# Patient Record
Sex: MALE | Race: WHITE | NOT HISPANIC OR LATINO | Employment: OTHER | URBAN - METROPOLITAN AREA
[De-identification: names, ages, dates, MRNs, and addresses within clinical notes are randomized per-mention and may not be internally consistent; named-entity substitution may affect disease eponyms.]

---

## 2016-11-28 LAB
CREAT ?TM UR-SCNC: 129 UMOL/L
MICROALBUMIN UR-MCNC: 1.3 MG/L (ref 0–20)
MICROALBUMIN/CREAT UR: 10 MG/G{CREAT}

## 2018-08-01 ENCOUNTER — TRANSCRIBE ORDERS (OUTPATIENT)
Dept: ADMINISTRATIVE | Facility: HOSPITAL | Age: 78
End: 2018-08-01

## 2018-08-01 ENCOUNTER — APPOINTMENT (OUTPATIENT)
Dept: LAB | Facility: CLINIC | Age: 78
End: 2018-08-01
Payer: MEDICARE

## 2018-08-01 DIAGNOSIS — R79.89 HYPOURICEMIA: ICD-10-CM

## 2018-08-01 DIAGNOSIS — R79.89 BLOOD CREATININE INCREASED COMPARED WITH PRIOR MEASUREMENT: Primary | ICD-10-CM

## 2018-08-01 LAB
ALBUMIN SERPL BCP-MCNC: 4.2 G/DL (ref 3.5–5)
ALP SERPL-CCNC: 35 U/L (ref 46–116)
ALT SERPL W P-5'-P-CCNC: 27 U/L (ref 12–78)
ANION GAP SERPL CALCULATED.3IONS-SCNC: 7 MMOL/L (ref 4–13)
AST SERPL W P-5'-P-CCNC: 19 U/L (ref 5–45)
BACTERIA UR QL AUTO: NORMAL /HPF
BASOPHILS # BLD AUTO: 0.03 THOUSANDS/ΜL (ref 0–0.1)
BASOPHILS NFR BLD AUTO: 0 % (ref 0–1)
BILIRUB SERPL-MCNC: 0.3 MG/DL (ref 0.2–1)
BILIRUB UR QL STRIP: NEGATIVE
BUN SERPL-MCNC: 30 MG/DL (ref 5–25)
CALCIUM SERPL-MCNC: 9.9 MG/DL (ref 8.3–10.1)
CHLORIDE SERPL-SCNC: 103 MMOL/L (ref 100–108)
CLARITY UR: CLEAR
CO2 SERPL-SCNC: 29 MMOL/L (ref 21–32)
COLOR UR: YELLOW
CREAT SERPL-MCNC: 2.01 MG/DL (ref 0.6–1.3)
CREAT UR-MCNC: 137 MG/DL
EOSINOPHIL # BLD AUTO: 0.24 THOUSAND/ΜL (ref 0–0.61)
EOSINOPHIL NFR BLD AUTO: 3 % (ref 0–6)
ERYTHROCYTE [DISTWIDTH] IN BLOOD BY AUTOMATED COUNT: 12.4 % (ref 11.6–15.1)
ERYTHROCYTE [SEDIMENTATION RATE] IN BLOOD: 10 MM/HOUR (ref 0–10)
GFR SERPL CREATININE-BSD FRML MDRD: 31 ML/MIN/1.73SQ M
GLUCOSE SERPL-MCNC: 115 MG/DL (ref 65–140)
GLUCOSE UR STRIP-MCNC: NEGATIVE MG/DL
HCT VFR BLD AUTO: 38.7 % (ref 36.5–49.3)
HGB BLD-MCNC: 13.1 G/DL (ref 12–17)
HGB UR QL STRIP.AUTO: NEGATIVE
IMM GRANULOCYTES # BLD AUTO: 0.05 THOUSAND/UL (ref 0–0.2)
IMM GRANULOCYTES NFR BLD AUTO: 1 % (ref 0–2)
KETONES UR STRIP-MCNC: NEGATIVE MG/DL
LEUKOCYTE ESTERASE UR QL STRIP: NEGATIVE
LYMPHOCYTES # BLD AUTO: 1.45 THOUSANDS/ΜL (ref 0.6–4.47)
LYMPHOCYTES NFR BLD AUTO: 17 % (ref 14–44)
MCH RBC QN AUTO: 31.5 PG (ref 26.8–34.3)
MCHC RBC AUTO-ENTMCNC: 33.9 G/DL (ref 31.4–37.4)
MCV RBC AUTO: 93 FL (ref 82–98)
MICROALBUMIN UR-MCNC: 16.2 MG/L (ref 0–20)
MICROALBUMIN/CREAT 24H UR: 12 MG/G CREATININE (ref 0–30)
MONOCYTES # BLD AUTO: 0.7 THOUSAND/ΜL (ref 0.17–1.22)
MONOCYTES NFR BLD AUTO: 8 % (ref 4–12)
NEUTROPHILS # BLD AUTO: 5.9 THOUSANDS/ΜL (ref 1.85–7.62)
NEUTS SEG NFR BLD AUTO: 71 % (ref 43–75)
NITRITE UR QL STRIP: NEGATIVE
NON-SQ EPI CELLS URNS QL MICRO: NORMAL /HPF
NRBC BLD AUTO-RTO: 0 /100 WBCS
PH UR STRIP.AUTO: 5.5 [PH] (ref 4.5–8)
PLATELET # BLD AUTO: 265 THOUSANDS/UL (ref 149–390)
PMV BLD AUTO: 9.1 FL (ref 8.9–12.7)
POTASSIUM SERPL-SCNC: 4 MMOL/L (ref 3.5–5.3)
PROT SERPL-MCNC: 7.6 G/DL (ref 6.4–8.2)
PROT UR STRIP-MCNC: NEGATIVE MG/DL
PSA SERPL-MCNC: 0.5 NG/ML (ref 0–4)
RBC # BLD AUTO: 4.16 MILLION/UL (ref 3.88–5.62)
RBC #/AREA URNS AUTO: NORMAL /HPF
SODIUM SERPL-SCNC: 139 MMOL/L (ref 136–145)
SP GR UR STRIP.AUTO: 1.02 (ref 1–1.03)
UROBILINOGEN UR QL STRIP.AUTO: 0.2 E.U./DL
WBC # BLD AUTO: 8.37 THOUSAND/UL (ref 4.31–10.16)
WBC #/AREA URNS AUTO: NORMAL /HPF

## 2018-08-01 PROCEDURE — 81001 URINALYSIS AUTO W/SCOPE: CPT | Performed by: FAMILY MEDICINE

## 2018-08-01 PROCEDURE — 86038 ANTINUCLEAR ANTIBODIES: CPT

## 2018-08-01 PROCEDURE — 36415 COLL VENOUS BLD VENIPUNCTURE: CPT

## 2018-08-01 PROCEDURE — 82043 UR ALBUMIN QUANTITATIVE: CPT | Performed by: FAMILY MEDICINE

## 2018-08-01 PROCEDURE — 80053 COMPREHEN METABOLIC PANEL: CPT

## 2018-08-01 PROCEDURE — 84153 ASSAY OF PSA TOTAL: CPT

## 2018-08-01 PROCEDURE — 82570 ASSAY OF URINE CREATININE: CPT | Performed by: FAMILY MEDICINE

## 2018-08-01 PROCEDURE — 85025 COMPLETE CBC W/AUTO DIFF WBC: CPT

## 2018-08-01 PROCEDURE — 85652 RBC SED RATE AUTOMATED: CPT

## 2018-08-02 ENCOUNTER — HOSPITAL ENCOUNTER (OUTPATIENT)
Dept: ULTRASOUND IMAGING | Facility: HOSPITAL | Age: 78
Discharge: HOME | End: 2018-08-02
Payer: MEDICARE

## 2018-08-02 ENCOUNTER — TELEPHONE (OUTPATIENT)
Dept: NEPHROLOGY | Facility: CLINIC | Age: 78
End: 2018-08-02

## 2018-08-02 ENCOUNTER — OFFICE VISIT (OUTPATIENT)
Dept: NEPHROLOGY | Facility: CLINIC | Age: 78
End: 2018-08-02
Payer: MEDICARE

## 2018-08-02 VITALS
HEIGHT: 70 IN | BODY MASS INDEX: 32.21 KG/M2 | SYSTOLIC BLOOD PRESSURE: 122 MMHG | DIASTOLIC BLOOD PRESSURE: 78 MMHG | HEART RATE: 76 BPM | WEIGHT: 225 LBS

## 2018-08-02 DIAGNOSIS — R79.89 BLOOD CREATININE INCREASED COMPARED WITH PRIOR MEASUREMENT: ICD-10-CM

## 2018-08-02 DIAGNOSIS — I12.9 BENIGN HYPERTENSION WITH CKD (CHRONIC KIDNEY DISEASE) STAGE III (HCC): ICD-10-CM

## 2018-08-02 DIAGNOSIS — R80.1 PERSISTENT PROTEINURIA: ICD-10-CM

## 2018-08-02 DIAGNOSIS — N18.30 BENIGN HYPERTENSION WITH CKD (CHRONIC KIDNEY DISEASE) STAGE III (HCC): ICD-10-CM

## 2018-08-02 DIAGNOSIS — N17.9 AKI (ACUTE KIDNEY INJURY) (HCC): Primary | ICD-10-CM

## 2018-08-02 DIAGNOSIS — R79.89 ELEVATED SERUM CREATININE: ICD-10-CM

## 2018-08-02 DIAGNOSIS — N18.30 STAGE 3 CHRONIC KIDNEY DISEASE (HCC): ICD-10-CM

## 2018-08-02 LAB
SL AMB  POCT GLUCOSE, UA: NORMAL
SL AMB LEUKOCYTE ESTERASE,UA: NORMAL
SL AMB POCT BILIRUBIN,UA: NORMAL
SL AMB POCT BLOOD,UA: NORMAL
SL AMB POCT CLARITY,UA: CLEAR
SL AMB POCT COLOR,UA: YELLOW
SL AMB POCT KETONES,UA: NORMAL
SL AMB POCT NITRITE,UA: NORMAL
SL AMB POCT PH,UA: 6.5
SL AMB POCT SPECIFIC GRAVITY,UA: 1.01
SL AMB POCT URINE PROTEIN: NORMAL
SL AMB POCT UROBILINOGEN: 0.2

## 2018-08-02 PROCEDURE — 99204 OFFICE O/P NEW MOD 45 MIN: CPT | Performed by: INTERNAL MEDICINE

## 2018-08-02 PROCEDURE — 76770 US EXAM ABDO BACK WALL COMP: CPT

## 2018-08-02 PROCEDURE — 81002 URINALYSIS NONAUTO W/O SCOPE: CPT | Performed by: INTERNAL MEDICINE

## 2018-08-02 RX ORDER — SIMVASTATIN 40 MG
40 TABLET ORAL
COMMUNITY

## 2018-08-02 RX ORDER — TRIAMTERENE AND HYDROCHLOROTHIAZIDE 37.5; 25 MG/1; MG/1
1 CAPSULE ORAL EVERY MORNING
COMMUNITY
End: 2018-08-02

## 2018-08-02 RX ORDER — AMLODIPINE BESYLATE 5 MG/1
5 TABLET ORAL DAILY
Qty: 30 TABLET | Refills: 1 | Status: SHIPPED | OUTPATIENT
Start: 2018-08-02

## 2018-08-02 RX ORDER — OMEGA-3 FATTY ACIDS CAP DELAYED RELEASE 1000 MG 1000 MG
1 CAPSULE DELAYED RELEASE ORAL DAILY
COMMUNITY

## 2018-08-02 RX ORDER — OMEPRAZOLE 20 MG/1
20 CAPSULE, DELAYED RELEASE ORAL DAILY
COMMUNITY

## 2018-08-02 RX ORDER — GABAPENTIN 300 MG/1
100 CAPSULE ORAL 4 TIMES DAILY
COMMUNITY

## 2018-08-02 RX ORDER — FENOFIBRATE 160 MG/1
160 TABLET ORAL DAILY
COMMUNITY

## 2018-08-02 RX ORDER — LANOLIN ALCOHOL/MO/W.PET/CERES
400 CREAM (GRAM) TOPICAL DAILY
COMMUNITY

## 2018-08-02 RX ORDER — MECLIZINE HYDROCHLORIDE 25 MG/1
TABLET ORAL 3 TIMES DAILY PRN
COMMUNITY

## 2018-08-02 RX ORDER — DIAZEPAM 5 MG/1
5 TABLET ORAL EVERY 6 HOURS PRN
COMMUNITY

## 2018-08-02 RX ORDER — LANOLIN ALCOHOL/MO/W.PET/CERES
3 CREAM (GRAM) TOPICAL
COMMUNITY

## 2018-08-02 RX ORDER — GLIMEPIRIDE 4 MG/1
4 TABLET ORAL
COMMUNITY

## 2018-08-02 RX ORDER — LOSARTAN POTASSIUM 50 MG/1
50 TABLET ORAL DAILY
COMMUNITY

## 2018-08-02 RX ORDER — VITAMIN B COMPLEX
1 CAPSULE ORAL DAILY
COMMUNITY

## 2018-08-02 NOTE — PROGRESS NOTES
NEPHROLOGY OUTPATIENT CONSULTATION   Jessica Hawk 65 y o  male MRN: [id number]  Date: 8/2/2018  Reason for consultation:   Chief Complaint   Patient presents with    Consult    Chronic Kidney Disease    Acute Renal Failure       ASSESSMENT AND PLAN:  ?  DEDE versus progressive worsening CKD stage 3, previous baseline creatinine 1 4 to 1 7, follows with nephrologist in [place]  -creatinine 1 4 in December 2017 has worsened to two in June 2018  Since then creatinine seems to be stable at 2 0 in July and on 8/1/18   -patient had has been losing weight and has lost about 9 lb  Overall he has progression of his CKD although any component of DEDE since June 2018 cannot be excluded  -clinically he seems euvolemic although any component of prerenal could be contributing to elevated creatinine   -will hold triamterene/HCTZ for now  Reduce losartan from 50 mg to 25 mg p o  Daily  -repeat BMP in 10 days  If serum creatinine remains stable or improved, okay from renal standpoint for surgery   -also strongly advised to hold losartan the day before and on the day of the surgery  -urinalysis this month shows no hematuria or proteinuria  Urine microalbumin/creatinine ratio 12 mg  -patient is getting another renal ultrasound today and results to follow   -prior renal ultrasound in 2016 showed normal size both kidneys, mild increased echogenicity in both kidneys suggestive of CKD  No hydronephrosis  1 cm stone in left kidney  CKD thought to be secondary to long-term hypertension and diabetes    Hypertension  -blood pressure acceptable in the office today   -hold triamterene/HCTZ as above  Reduce losartan as above  Will start amlodipine 5 mg p o  daily for time being   -continue to monitor blood pressure closely   -salt restricted diet    Patient is being scheduled for meniscus surgery on right knee      HISTORY OF PRESENT ILLNESS:  Jessica Hawk is a 65y o  year old male with medical issues of hypertension for 20 years, diabetes for 15 years, CKD stage 3 with baseline creatinine 1 4 to 1 7, hyperlipidemia, who presents for initial consultation for renal failure and preop clearance  Old medical records were reviewed  Patient regularly follows with nephrologist in [place]  He is visiting in [place] and has required orthopedic surgery for meniscus on right knee requiring preop clearance  Patient's baseline serum creatinine seems to be 1 4 to 1 7 going back to 2016 %period% creatinine was 1 4 in December 2017 with repeat creatinine six months afterwards was 2 0 in June, July and again on 8/1/18  Patient denies any obvious symptoms suggesting prerenal etiology  Denies any nausea, vomiting or diarrhea  He has been trying to lose weight and has lost about 9 lb weight since he came to [place]  Denies any urinary symptoms including no dysuria or hematuria or hesitancy  No recent NSAID exposure  No other recent change in medications  He remains on stable dose of losartan and triamterene/HCTZ for hypertension  REVIEW OF SYSTEMS:    Review of Systems   Constitutional: Negative for chills, fatigue and fever  HENT: Negative for congestion, ear pain and postnasal drip  Eyes: Negative for redness and visual disturbance  Respiratory: Negative for cough and shortness of breath  Cardiovascular: Negative for chest pain and leg swelling  Gastrointestinal: Negative for abdominal pain, diarrhea, nausea and vomiting  Endocrine: Negative for polyuria  Genitourinary: Negative for decreased urine volume, difficulty urinating, dysuria, frequency, hematuria and urgency  Musculoskeletal: Negative for arthralgias and back pain  Skin: Negative for rash  Neurological: Negative for dizziness, light-headedness and headaches  Hematological: Does not bruise/bleed easily  Psychiatric/Behavioral: Negative for confusion  The patient is not nervous/anxious          More than 10 point review of systems were obtained and discussed in length with the patient  Complete review of systems were negative / unremarkable except mentioned in the HPI section  PHYSICAL EXAM:  Vitals:    08/02/18 1204 08/02/18 1242   BP: 152/86 122/78   BP Location: Left arm    Patient Position: Sitting    Cuff Size: Adult    Pulse: 76    Weight: 102 kg (225 lb)    Height: 5' 10" (1 778 m)      Body mass index is 32 28 kg/m²  Physical Exam   Constitutional: He is oriented to person, place, and time  He appears well-developed and well-nourished  HENT:   Head: Normocephalic and atraumatic  Right Ear: External ear normal    Left Ear: External ear normal    Nose: Nose normal    Eyes: Conjunctivae and EOM are normal  Pupils are equal, round, and reactive to light  No scleral icterus  Neck: Neck supple  No JVD present  Cardiovascular: Normal rate and normal heart sounds  Pulmonary/Chest: Effort normal and breath sounds normal  No respiratory distress  He has no wheezes  He has no rales  Abdominal: Soft  Bowel sounds are normal  He exhibits no distension  There is no tenderness  Musculoskeletal: He exhibits no edema or tenderness  Neurological: He is alert and oriented to person, place, and time  Skin: Skin is warm and dry  Psychiatric: He has a normal mood and affect  His behavior is normal    Vitals reviewed        PAST MEDICAL HISTORY:  Past Medical History:   Diagnosis Date    Chronic kidney disease     Diabetes mellitus ([facility] [address] )        PAST SURGICAL HISTORY:  Past Surgical History:   Procedure Laterality Date    BACK SURGERY      GALLBLADDER SURGERY      LAMINECTOMY      REPLACEMENT TOTAL KNEE      URETHRECTOMY         ALLERGIES:  No Known Allergies    SOCIAL HISTORY:  History   Alcohol Use No     History   Drug Use No     History   Smoking Status    Former Smoker   Smokeless Tobacco    Never Used       FAMILY HISTORY:  Family History   Problem Relation Age of Onset    Cancer Father     Heart disease Father     Diabetes Brother     Cancer Brother        MEDICATIONS:    Current Outpatient Prescriptions:     b complex vitamins capsule, Take 1 capsule by mouth daily, Disp: , Rfl:     diazepam (VALIUM) 5 mg tablet, Take 5 mg by mouth every 6 (six) hours as needed for anxiety, Disp: , Rfl:     fenofibrate (TRIGLIDE) 160 MG tablet, Take 160 mg by mouth daily, Disp: , Rfl:     fluticasone (VERAMYST) 27 5 MCG/SPRAY nasal spray, 2 sprays into each nostril daily, Disp: , Rfl:     folic acid (FOLVITE) 854 mcg tablet, Take 400 mcg by mouth daily, Disp: , Rfl:     gabapentin (NEURONTIN) 300 mg capsule, Take 100 mg by mouth 4 (four) times a day, Disp: , Rfl:     glimepiride (AMARYL) 4 mg tablet, Take 4 mg by mouth every morning before breakfast, Disp: , Rfl:     losartan (COZAAR) 50 mg tablet, Take 50 mg by mouth daily , Disp: , Rfl:     meclizine (ANTIVERT) 25 mg tablet, Take by mouth 3 (three) times a day as needed for dizziness, Disp: , Rfl:     melatonin 3 mg, Take 3 mg by mouth daily at bedtime, Disp: , Rfl:     Omega-3 Fatty Acids (FISH OIL) 1000 MG CPDR, Take 1 capsule by mouth daily, Disp: , Rfl:     omeprazole (PriLOSEC) 20 mg delayed release capsule, Take 20 mg by mouth daily, Disp: , Rfl:     simvastatin (ZOCOR) 40 mg tablet, Take 40 mg by mouth daily at bedtime, Disp: , Rfl:     amLODIPine (NORVASC) 5 mg tablet, Take 1 tablet (5 mg total) by mouth daily, Disp: 30 tablet, Rfl: 1    Lab Results:   Results for orders placed or performed in visit on 08/02/18   POCT urine dip   Result Value Ref Range    LEUKOCYTE ESTERASE,UA NEG      NITRITE,UA NEG     SL AMB POCT UROBILINOGEN 0 2     SL AMB POCT URINE PROTEIN NEG      PH,UA 6 5      BLOOD,UA NEG      SPECIFIC GRAVITY,UA 1 015      KETONES,UA NEG      BILIRUBIN,UA NEG     GLUCOSE, UA NEG      COLOR,UA YELLOW      CLARITY,UA CLEAR      Serum creatinine 2 0 on 8/1/18  Portions of the record may have been created with voice recognition software   Occasional wrong word or "sound a like" substitutions may have occurred due to the inherent limitations of voice recognition software  Read the chart carefully and recognize, using context, where substitutions have occurred

## 2018-08-02 NOTE — LETTER
August 2, 2018     [address] [place] [address] Mary Leal    Patient: Mortimer Lather   YOB: 1940   Date of Visit: 8/2/2018       Dear Dr Jinny Masters:    Thank you for referring Gaurav Olivarez to me for evaluation  Below are my notes for this consultation  If you have questions, please do not hesitate to call me  I look forward to following your patient along with you  Sincerely,        Anais Grover MD        CC: MD Anais Shelley MD  8/2/2018  4:09 PM  Sign at close encounter  [address] [place] [address] 65 y o  male MRN: [id number]  Date: 8/2/2018  Reason for consultation:   Chief Complaint   Patient presents with    Consult    Chronic Kidney Disease    Acute Renal Failure       ASSESSMENT AND PLAN:  ?  DEDE versus progressive worsening CKD stage 3, previous baseline creatinine 1 4 to 1 7, follows with nephrologist in [place]  -creatinine 1 4 in December 2017 has worsened to two in June 2018  Since then creatinine seems to be stable at 2 0 in July and on 8/1/18   -patient had has been losing weight and has lost about 9 lb  Overall he has progression of his CKD although any component of DEDE since June 2018 cannot be excluded  -clinically he seems euvolemic although any component of prerenal could be contributing to elevated creatinine   -will hold triamterene/HCTZ for now  Reduce losartan from 50 mg to 25 mg p o  Daily  -repeat BMP in 10 days  If serum creatinine remains stable or improved, okay from renal standpoint for surgery   -also strongly advised to hold losartan the day before and on the day of the surgery  -urinalysis this month shows no hematuria or proteinuria  Urine microalbumin/creatinine ratio 12 mg  -patient is getting another renal ultrasound today and results to follow   -prior renal ultrasound in 2016 showed normal size both kidneys, mild increased echogenicity in both kidneys suggestive of CKD  No hydronephrosis    1 cm stone in left kidney  CKD thought to be secondary to long-term hypertension and diabetes    Hypertension  -blood pressure acceptable in the office today   -hold triamterene/HCTZ as above  Reduce losartan as above  Will start amlodipine 5 mg p o  daily for time being   -continue to monitor blood pressure closely   -salt restricted diet    Patient is being scheduled for meniscus surgery on right knee  HISTORY OF PRESENT ILLNESS:  Azucena Schirmer is a 65y o  year old male with medical issues of hypertension for 20 years, diabetes for 15 years, CKD stage 3 with baseline creatinine 1 4 to 1 7, hyperlipidemia, who presents for initial consultation for renal failure and preop clearance  Old medical records were reviewed  Patient regularly follows with nephrologist in [place]  He is visiting in [place] and has required orthopedic surgery for meniscus on right knee requiring preop clearance  Patient's baseline serum creatinine seems to be 1 4 to 1 7 going back to 2016 %period% creatinine was 1 4 in December 2017 with repeat creatinine six months afterwards was 2 0 in June, July and again on 8/1/18  Patient denies any obvious symptoms suggesting prerenal etiology  Denies any nausea, vomiting or diarrhea  He has been trying to lose weight and has lost about 9 lb weight since he came to [place]  Denies any urinary symptoms including no dysuria or hematuria or hesitancy  No recent NSAID exposure  No other recent change in medications  He remains on stable dose of losartan and triamterene/HCTZ for hypertension  REVIEW OF SYSTEMS:    Review of Systems   Constitutional: Negative for chills, fatigue and fever  HENT: Negative for congestion, ear pain and postnasal drip  Eyes: Negative for redness and visual disturbance  Respiratory: Negative for cough and shortness of breath  Cardiovascular: Negative for chest pain and leg swelling     Gastrointestinal: Negative for abdominal pain, diarrhea, nausea and vomiting  Endocrine: Negative for polyuria  Genitourinary: Negative for decreased urine volume, difficulty urinating, dysuria, frequency, hematuria and urgency  Musculoskeletal: Negative for arthralgias and back pain  Skin: Negative for rash  Neurological: Negative for dizziness, light-headedness and headaches  Hematological: Does not bruise/bleed easily  Psychiatric/Behavioral: Negative for confusion  The patient is not nervous/anxious  More than 10 point review of systems were obtained and discussed in length with the patient  Complete review of systems were negative / unremarkable except mentioned in the HPI section  PHYSICAL EXAM:  Vitals:    08/02/18 1204 08/02/18 1242   BP: 152/86 122/78   BP Location: Left arm    Patient Position: Sitting    Cuff Size: Adult    Pulse: 76    Weight: 102 kg (225 lb)    Height: 5' 10" (1 778 m)      Body mass index is 32 28 kg/m²  Physical Exam   Constitutional: He is oriented to person, place, and time  He appears well-developed and well-nourished  HENT:   Head: Normocephalic and atraumatic  Right Ear: External ear normal    Left Ear: External ear normal    Nose: Nose normal    Eyes: Conjunctivae and EOM are normal  Pupils are equal, round, and reactive to light  No scleral icterus  Neck: Neck supple  No JVD present  Cardiovascular: Normal rate and normal heart sounds  Pulmonary/Chest: Effort normal and breath sounds normal  No respiratory distress  He has no wheezes  He has no rales  Abdominal: Soft  Bowel sounds are normal  He exhibits no distension  There is no tenderness  Musculoskeletal: He exhibits no edema or tenderness  Neurological: He is alert and oriented to person, place, and time  Skin: Skin is warm and dry  Psychiatric: He has a normal mood and affect  His behavior is normal    Vitals reviewed        PAST MEDICAL HISTORY:  Past Medical History:   Diagnosis Date    Chronic kidney disease     Diabetes mellitus ([facility] [address] )        PAST SURGICAL HISTORY:  Past Surgical History:   Procedure Laterality Date    BACK SURGERY      GALLBLADDER SURGERY      LAMINECTOMY      REPLACEMENT TOTAL KNEE      URETHRECTOMY         ALLERGIES:  No Known Allergies    SOCIAL HISTORY:  History   Alcohol Use No     History   Drug Use No     History   Smoking Status    Former Smoker   Smokeless Tobacco    Never Used       FAMILY HISTORY:  Family History   Problem Relation Age of Onset    Cancer Father     Heart disease Father     Diabetes Brother     Cancer Brother        MEDICATIONS:    Current Outpatient Prescriptions:     b complex vitamins capsule, Take 1 capsule by mouth daily, Disp: , Rfl:     diazepam (VALIUM) 5 mg tablet, Take 5 mg by mouth every 6 (six) hours as needed for anxiety, Disp: , Rfl:     fenofibrate (TRIGLIDE) 160 MG tablet, Take 160 mg by mouth daily, Disp: , Rfl:     fluticasone (VERAMYST) 27 5 MCG/SPRAY nasal spray, 2 sprays into each nostril daily, Disp: , Rfl:     folic acid (FOLVITE) 513 mcg tablet, Take 400 mcg by mouth daily, Disp: , Rfl:     gabapentin (NEURONTIN) 300 mg capsule, Take 100 mg by mouth 4 (four) times a day, Disp: , Rfl:     glimepiride (AMARYL) 4 mg tablet, Take 4 mg by mouth every morning before breakfast, Disp: , Rfl:     losartan (COZAAR) 50 mg tablet, Take 50 mg by mouth daily , Disp: , Rfl:     meclizine (ANTIVERT) 25 mg tablet, Take by mouth 3 (three) times a day as needed for dizziness, Disp: , Rfl:     melatonin 3 mg, Take 3 mg by mouth daily at bedtime, Disp: , Rfl:     Omega-3 Fatty Acids (FISH OIL) 1000 MG CPDR, Take 1 capsule by mouth daily, Disp: , Rfl:     omeprazole (PriLOSEC) 20 mg delayed release capsule, Take 20 mg by mouth daily, Disp: , Rfl:     simvastatin (ZOCOR) 40 mg tablet, Take 40 mg by mouth daily at bedtime, Disp: , Rfl:     amLODIPine (NORVASC) 5 mg tablet, Take 1 tablet (5 mg total) by mouth daily, Disp: 30 tablet, Rfl: 1    Lab Results: Results for orders placed or performed in visit on 08/02/18   POCT urine dip   Result Value Ref Range    LEUKOCYTE ESTERASE,UA NEG      NITRITE,UA NEG     SL AMB POCT UROBILINOGEN 0 2     SL AMB POCT URINE PROTEIN NEG      PH,UA 6 5      BLOOD,UA NEG      SPECIFIC GRAVITY,UA 1 015      KETONES,UA NEG      BILIRUBIN,UA NEG     GLUCOSE, UA NEG      COLOR,UA YELLOW      CLARITY,UA CLEAR      Serum creatinine 2 0 on 8/1/18  Portions of the record may have been created with voice recognition software  Occasional wrong word or "sound a like" substitutions may have occurred due to the inherent limitations of voice recognition software  Read the chart carefully and recognize, using context, where substitutions have occurred

## 2018-08-02 NOTE — PATIENT INSTRUCTIONS
-START AMLODIPINE 5 MG DAILY  -DECREASE LOSARTAN FROM 50 MG TO 25 MG DAILY  -HOLD TRIAMTERENE/HYDROCHLOROTHIAZIDE FOR NOW  -REPEAT BLOOD TEST IN 10 DAYS  -DRINK ADEQUATE LIQUID DAILY  -HOLD LOSARTAN THE DAY BEFORE AND ON THE DAY OF THE SURGERY

## 2018-08-02 NOTE — TELEPHONE ENCOUNTER
Nati Watson the pharmacist from [address] sant[place] she stated that pt has an interaction with medication pt is taking amlodipine 5mg and simvastatin 40mg  But she stated in order for it no to be interacting with each other the sinvastatin must be 20mg please advise thank you

## 2018-08-02 NOTE — TELEPHONE ENCOUNTER
I am okay reducing simvastatin to 20 mg p o  daily by he remains on amlodipine  Likely amlodipine will be temporary while he gets through orthopedic surgery  Can you please let patient and pharmacy know? Thanks

## 2018-08-03 LAB — RYE IGE QN: NEGATIVE

## 2018-08-03 NOTE — TELEPHONE ENCOUNTER
Called pharmacy and spoke to pt detailed message given about    I am okay reducing simvastatin to 20 mg p o  daily by he remains on amlodipine  Likely amlodipine will be temporary while he gets through orthopedic surgery  Can you please let patient and pharmacy know? Thanks         Electronically signed by Claudean Reels, MD at 8/2/2018  4:07 PM

## 2018-08-13 ENCOUNTER — APPOINTMENT (OUTPATIENT)
Dept: LAB | Facility: CLINIC | Age: 78
End: 2018-08-13
Payer: MEDICARE

## 2018-08-13 DIAGNOSIS — N18.30 STAGE 3 CHRONIC KIDNEY DISEASE (HCC): ICD-10-CM

## 2018-08-13 DIAGNOSIS — N17.9 AKI (ACUTE KIDNEY INJURY) (HCC): ICD-10-CM

## 2018-08-13 LAB
ANION GAP SERPL CALCULATED.3IONS-SCNC: 8 MMOL/L (ref 4–13)
BUN SERPL-MCNC: 26 MG/DL (ref 5–25)
CALCIUM SERPL-MCNC: 9.2 MG/DL (ref 8.3–10.1)
CHLORIDE SERPL-SCNC: 107 MMOL/L (ref 100–108)
CO2 SERPL-SCNC: 28 MMOL/L (ref 21–32)
CREAT SERPL-MCNC: 1.79 MG/DL (ref 0.6–1.3)
GFR SERPL CREATININE-BSD FRML MDRD: 36 ML/MIN/1.73SQ M
GLUCOSE P FAST SERPL-MCNC: 136 MG/DL (ref 65–99)
POTASSIUM SERPL-SCNC: 4.8 MMOL/L (ref 3.5–5.3)
SODIUM SERPL-SCNC: 143 MMOL/L (ref 136–145)

## 2018-08-13 PROCEDURE — 80048 BASIC METABOLIC PNL TOTAL CA: CPT

## 2018-08-13 PROCEDURE — 36415 COLL VENOUS BLD VENIPUNCTURE: CPT

## 2018-08-15 ENCOUNTER — TELEPHONE (OUTPATIENT)
Dept: NEPHROLOGY | Facility: CLINIC | Age: 78
End: 2018-08-15

## 2018-08-16 NOTE — TELEPHONE ENCOUNTER
Discussed with patient regarding his last lab results  Serum creatinine has improved to 1 7 which is close to his baseline  Continue to hold triamterene/HCTZ for now until he sees his regular nephrologist in [place]  Continue losartan at lower dose 25 mg p o  daily and strongly advised to hold losartan the day before and on the day of the surgery  He is okay from renal standpoint to proceed with the surgery  Continue current amlodipine as well  Patient says he will reach out to his orthopedic surgery office to notify